# Patient Record
Sex: MALE | Race: WHITE | NOT HISPANIC OR LATINO
[De-identification: names, ages, dates, MRNs, and addresses within clinical notes are randomized per-mention and may not be internally consistent; named-entity substitution may affect disease eponyms.]

---

## 2019-07-03 ENCOUNTER — APPOINTMENT (OUTPATIENT)
Dept: CARDIOLOGY | Facility: CLINIC | Age: 59
End: 2019-07-03
Payer: COMMERCIAL

## 2019-07-03 PROCEDURE — 99214 OFFICE O/P EST MOD 30 MIN: CPT | Mod: 25

## 2019-07-03 PROCEDURE — 93283 PRGRMG EVAL IMPLANTABLE DFB: CPT | Mod: 59

## 2019-07-03 PROCEDURE — 93290 INTERROG DEV EVAL ICPMS IP: CPT | Mod: 59

## 2019-09-26 ENCOUNTER — RESULT REVIEW (OUTPATIENT)
Age: 59
End: 2019-09-26

## 2019-09-26 ENCOUNTER — TRANSCRIPTION ENCOUNTER (OUTPATIENT)
Age: 59
End: 2019-09-26

## 2019-09-26 ENCOUNTER — OUTPATIENT (OUTPATIENT)
Dept: OUTPATIENT SERVICES | Facility: HOSPITAL | Age: 59
LOS: 1 days | Discharge: HOME | End: 2019-09-26
Payer: COMMERCIAL

## 2019-09-26 VITALS
SYSTOLIC BLOOD PRESSURE: 107 MMHG | RESPIRATION RATE: 18 BRPM | DIASTOLIC BLOOD PRESSURE: 69 MMHG | HEART RATE: 61 BPM | TEMPERATURE: 98 F | HEIGHT: 65 IN | WEIGHT: 188.94 LBS

## 2019-09-26 VITALS — DIASTOLIC BLOOD PRESSURE: 61 MMHG | HEART RATE: 66 BPM | RESPIRATION RATE: 18 BRPM | SYSTOLIC BLOOD PRESSURE: 109 MMHG

## 2019-09-26 DIAGNOSIS — I25.810 ATHEROSCLEROSIS OF CORONARY ARTERY BYPASS GRAFT(S) WITHOUT ANGINA PECTORIS: Chronic | ICD-10-CM

## 2019-09-26 DIAGNOSIS — C44.91 BASAL CELL CARCINOMA OF SKIN, UNSPECIFIED: Chronic | ICD-10-CM

## 2019-09-26 DIAGNOSIS — K43.2 INCISIONAL HERNIA WITHOUT OBSTRUCTION OR GANGRENE: Chronic | ICD-10-CM

## 2019-09-26 DIAGNOSIS — Z90.49 ACQUIRED ABSENCE OF OTHER SPECIFIED PARTS OF DIGESTIVE TRACT: Chronic | ICD-10-CM

## 2019-09-26 PROCEDURE — 88305 TISSUE EXAM BY PATHOLOGIST: CPT | Mod: 26

## 2019-09-26 NOTE — ASU PATIENT PROFILE, ADULT - PSH
Basal cell carcinoma    Coronary artery disease involving coronary bypass graft    History of cholecystectomy    Incisional hernia

## 2019-09-26 NOTE — H&P PST ADULT - NSICDXPASTSURGICALHX_GEN_ALL_CORE_FT
PAST SURGICAL HISTORY:  Basal cell carcinoma     Coronary artery disease involving coronary bypass graft     History of cholecystectomy     Incisional hernia

## 2019-09-27 LAB — SURGICAL PATHOLOGY STUDY: SIGNIFICANT CHANGE UP

## 2019-10-01 DIAGNOSIS — Z12.11 ENCOUNTER FOR SCREENING FOR MALIGNANT NEOPLASM OF COLON: ICD-10-CM

## 2019-10-01 DIAGNOSIS — K64.8 OTHER HEMORRHOIDS: ICD-10-CM

## 2019-10-01 DIAGNOSIS — K57.30 DIVERTICULOSIS OF LARGE INTESTINE WITHOUT PERFORATION OR ABSCESS WITHOUT BLEEDING: ICD-10-CM

## 2019-12-04 ENCOUNTER — APPOINTMENT (OUTPATIENT)
Dept: CARDIOLOGY | Facility: CLINIC | Age: 59
End: 2019-12-04

## 2020-07-03 PROBLEM — E78.00 PURE HYPERCHOLESTEROLEMIA, UNSPECIFIED: Chronic | Status: ACTIVE | Noted: 2019-09-26

## 2020-07-03 PROBLEM — I25.10 ATHEROSCLEROTIC HEART DISEASE OF NATIVE CORONARY ARTERY WITHOUT ANGINA PECTORIS: Chronic | Status: ACTIVE | Noted: 2019-09-26

## 2020-07-22 ENCOUNTER — APPOINTMENT (OUTPATIENT)
Dept: CARDIOLOGY | Facility: CLINIC | Age: 60
End: 2020-07-22
Payer: COMMERCIAL

## 2020-07-22 PROCEDURE — 93283 PRGRMG EVAL IMPLANTABLE DFB: CPT | Mod: 59

## 2020-07-22 PROCEDURE — 93290 INTERROG DEV EVAL ICPMS IP: CPT | Mod: 59

## 2020-07-22 PROCEDURE — 99214 OFFICE O/P EST MOD 30 MIN: CPT | Mod: 25

## 2020-09-18 ENCOUNTER — RECORD ABSTRACTING (OUTPATIENT)
Age: 60
End: 2020-09-18

## 2020-09-18 DIAGNOSIS — Z87.19 PERSONAL HISTORY OF OTHER DISEASES OF THE DIGESTIVE SYSTEM: ICD-10-CM

## 2020-09-18 DIAGNOSIS — I25.2 OLD MYOCARDIAL INFARCTION: ICD-10-CM

## 2020-09-18 DIAGNOSIS — Z86.79 PERSONAL HISTORY OF OTHER DISEASES OF THE CIRCULATORY SYSTEM: ICD-10-CM

## 2020-09-18 DIAGNOSIS — Z78.9 OTHER SPECIFIED HEALTH STATUS: ICD-10-CM

## 2020-09-28 NOTE — ASU PATIENT PROFILE, ADULT - BLOOD TRANSFUSION, PREVIOUS, PROFILE
Body Location Override (Optional - Billing Will Still Be Based On Selected Body Map Location If Applicable): left post shoulder Detail Level: Detailed Add 08990 Cpt? (Important Note: In 2017 The Use Of 21761 Is Being Tracked By Cms To Determine Future Global Period Reimbursement For Global Periods): yes no

## 2020-10-07 ENCOUNTER — APPOINTMENT (OUTPATIENT)
Dept: CARDIOLOGY | Facility: CLINIC | Age: 60
End: 2020-10-07
Payer: COMMERCIAL

## 2020-10-07 PROCEDURE — 93306 TTE W/DOPPLER COMPLETE: CPT

## 2020-10-19 ENCOUNTER — APPOINTMENT (OUTPATIENT)
Dept: CARDIOLOGY | Facility: CLINIC | Age: 60
End: 2020-10-19
Payer: COMMERCIAL

## 2020-10-19 VITALS
TEMPERATURE: 98.1 F | HEART RATE: 54 BPM | DIASTOLIC BLOOD PRESSURE: 60 MMHG | BODY MASS INDEX: 30.66 KG/M2 | SYSTOLIC BLOOD PRESSURE: 100 MMHG | HEIGHT: 65 IN | WEIGHT: 184 LBS

## 2020-10-19 DIAGNOSIS — Z00.00 ENCOUNTER FOR GENERAL ADULT MEDICAL EXAMINATION W/OUT ABNORMAL FINDINGS: ICD-10-CM

## 2020-10-19 PROCEDURE — 99214 OFFICE O/P EST MOD 30 MIN: CPT | Mod: 25

## 2020-10-19 PROCEDURE — 93283 PRGRMG EVAL IMPLANTABLE DFB: CPT | Mod: 59

## 2020-10-19 PROCEDURE — 93000 ELECTROCARDIOGRAM COMPLETE: CPT | Mod: 59

## 2020-10-19 PROCEDURE — 93290 INTERROG DEV EVAL ICPMS IP: CPT | Mod: 26

## 2020-10-19 PROCEDURE — 99072 ADDL SUPL MATRL&STAF TM PHE: CPT

## 2020-10-19 NOTE — HISTORY OF PRESENT ILLNESS
[de-identified] : 58 Y/O MALE WITH PMH OF CAD, POST MI 2006, CABG 3 VS 2006, ICM, POST  ICD MDTRONIC 2006,84862, GALL BLADDER/HERNIA 2013\par \par RETURNING FOR FOLLOW UP: DISCUSS RECENT ECHO RESULTS \par \par DENIES CP/SOB OR PALPITATIONS. NO DIZZINESS , NO SYNCOPE . STABLE ALMEIDA. \par \par

## 2020-10-19 NOTE — ADDENDUM
[FreeTextEntry1] : I was present with the NP/PA during the history and exam of the patient. I discussed patient’s management in details. I reviewed the NP’s note and agree with the documented findings and plan of care. Please do not hesitate to contact me if you have any further questions at [phone]. \par \par \par

## 2020-10-19 NOTE — PROCEDURE
[No] : not [NSR] : normal sinus rhythm [ICD] : Implantable cardioverter-defibrillator [DDD] : DDD [Voltage: ___ volts] : Voltage was [unfilled] volts [Threshold Testing Performed] : Threshold testing was performed [Magnet Rate: ___ Ppm] : magnet rate was [unfilled] Ppm [Lead Imp:  ___ohms] : lead impedance was [unfilled] ohms [Sensing Amplitude ___mv] : sensing amplitude was [unfilled] mv [___V @] : [unfilled] V [___ ms] : [unfilled] ms [Counters Reset] : the counters were reset [None] : none [Asense-Vsense ___ %] : Asense-Vsense [unfilled]% [Asense-Vpace ___ %] : Asense-Vpace [unfilled]% [Apace-Vsense ___ %] : Apace-Vsense [unfilled]% [Apace-Vpace ___ %] : Apace-Vpace [unfilled]% [de-identified] : [id number] [de-identified] : SUKHDEV [de-identified] : [id number] [de-identified] : 5/1/2013 [de-identified] : 50 [de-identified] : CHINEDU [de-identified] : Normal device function. 1 NSVT on 8/13/20 lasting 3 seconds in duration (avg. v-rate 175 bpm). 1 SVT on 10/5/20 lasting 30 seconds (avg. v-rate 158 bpm). Optivol Fluid index is within normal limits.

## 2020-10-19 NOTE — ASSESSMENT
[FreeTextEntry1] : ECG ( 10/19/2020) : SINUS BRADYCARDIA AT 54 BP, REMOTE MI \par \par AICD INTERROGATION: NORMAL FUNCTION. 1 NSVT -13 SECONDS, 1 EPISODE OF ST . ALL PARAMETER STABLE.  THORACIC IMPEDANCE IS STABLE . \par HIS REMOTE MONITOR IS DISCONNECTED , HE IS NOT INTERESTED IN REMOTE SERVICES AT THIS TIME. \par \par ECHO (10/7/2020): EF 20-25%, TRACE MR \par REPORTS C/W MEDS . \par \par A/P \par \par 1. ICM/ S/P AICD 5/2013 \par \par -CONTINUE GDMT FOR ICM \par -CONTINUE CURRENT MEDS\par -OBTAIN MUGA SCAN TO REASSESS LVF  ( DISCREPANCY IN ECHO FINDINGS)  \par -RETURN FOR FOLLOW UP IN 3 MONTHS \par -F/U WITH CARDIO/DR ABDULLAHI AS SCHEDULED

## 2020-11-19 ENCOUNTER — NON-APPOINTMENT (OUTPATIENT)
Age: 60
End: 2020-11-19

## 2021-01-20 ENCOUNTER — APPOINTMENT (OUTPATIENT)
Dept: CARDIOLOGY | Facility: CLINIC | Age: 61
End: 2021-01-20

## 2021-01-27 NOTE — PHYSICAL EXAM
[General Appearance - Well Nourished] : well nourished [General Appearance - Well Developed] : well developed [Heart Sounds] : normal S1 and S2 No [] : no respiratory distress [Heart Rate And Rhythm] : heart rate and rhythm were normal [Left Infraclavicular] : left infraclavicular area [Respiration, Rhythm And Depth] : normal respiratory rhythm and effort [Dry] : dry [Clean] : clean [Bowel Sounds] : normal bowel sounds [Well-Healed] : well-healed [Abdomen Soft] : soft [Nail Clubbing] : no clubbing of the fingernails [Cyanosis, Localized] : no localized cyanosis

## 2021-02-16 ENCOUNTER — APPOINTMENT (OUTPATIENT)
Dept: CARDIOLOGY | Facility: CLINIC | Age: 61
End: 2021-02-16
Payer: COMMERCIAL

## 2021-02-16 PROCEDURE — 99072 ADDL SUPL MATRL&STAF TM PHE: CPT

## 2021-02-16 PROCEDURE — 93290 INTERROG DEV EVAL ICPMS IP: CPT | Mod: 26

## 2021-02-16 PROCEDURE — 93282 PRGRMG EVAL IMPLANTABLE DFB: CPT

## 2021-02-16 PROCEDURE — 99213 OFFICE O/P EST LOW 20 MIN: CPT

## 2021-02-16 NOTE — PHYSICAL EXAM
[General Appearance - Well Developed] : well developed [Normal Appearance] : normal appearance [Well Groomed] : well groomed [General Appearance - Well Nourished] : well nourished [No Deformities] : no deformities [Heart Rate And Rhythm] : heart rate and rhythm were normal [General Appearance - In No Acute Distress] : no acute distress [Heart Sounds] : normal S1 and S2 [Murmurs] : no murmurs present [Respiration, Rhythm And Depth] : normal respiratory rhythm and effort [Exaggerated Use Of Accessory Muscles For Inspiration] : no accessory muscle use [Auscultation Breath Sounds / Voice Sounds] : lungs were clear to auscultation bilaterally [Clean] : clean [Dry] : dry [Well-Healed] : well-healed [Abdomen Soft] : soft [Abdomen Tenderness] : non-tender [Abdomen Mass (___ Cm)] : no abdominal mass palpated [Nail Clubbing] : no clubbing of the fingernails [Cyanosis, Localized] : no localized cyanosis [] : no ischemic changes [Petechial Hemorrhages (___cm)] : no petechial hemorrhages

## 2021-02-16 NOTE — HISTORY OF PRESENT ILLNESS
[None] : The patient complains of no symptoms [Palpitations] : no palpitations [SOB] : no dyspnea [Erythema at Site] : no erythema at device site [Swelling at Site] : no swelling at device site

## 2021-02-16 NOTE — PROCEDURE
[See Scanned Paceart Report] : See scanned paceart report [See Device Printout] : See device printout [ICD] : Implantable cardioverter-defibrillator [de-identified] : Medtronic

## 2021-06-07 PROBLEM — I25.2 PAST MYOCARDIAL INFARCTION: Status: ACTIVE | Noted: 2021-06-07

## 2021-06-08 ENCOUNTER — APPOINTMENT (OUTPATIENT)
Dept: CARDIOLOGY | Facility: CLINIC | Age: 61
End: 2021-06-08
Payer: COMMERCIAL

## 2021-06-08 VITALS
HEIGHT: 65 IN | WEIGHT: 188 LBS | DIASTOLIC BLOOD PRESSURE: 64 MMHG | BODY MASS INDEX: 31.32 KG/M2 | SYSTOLIC BLOOD PRESSURE: 112 MMHG

## 2021-06-08 DIAGNOSIS — I25.2 OLD MYOCARDIAL INFARCTION: ICD-10-CM

## 2021-06-08 PROCEDURE — 99213 OFFICE O/P EST LOW 20 MIN: CPT

## 2021-06-08 PROCEDURE — 99072 ADDL SUPL MATRL&STAF TM PHE: CPT

## 2021-06-08 NOTE — PHYSICAL EXAM
[Normal Appearance] : was normal in appearance [5th Left ICS - MCL] : palpated at the 5th LICS in the midclavicular line [No Precordial Heave] : no precordial heave was noted [Normal Rate] : normal [Rhythm Regular] : regular [Normal S1] : normal S1 [Normal S2] : normal S2 [I] : a grade 1 [No Pitting Edema] : no pitting edema present [2+] : left 2+ [No Abnormalities] : the abdominal aorta was not enlarged and no bruit was heard [Normal] : alert and oriented, normal memory [JVP Elevated ___cm] : the JVP was not elevated [S3] : no S3 [S4] : no S4 [Right Carotid Bruit] : no bruit heard over the right carotid [Left Carotid Bruit] : no bruit heard over the left carotid

## 2021-06-08 NOTE — REVIEW OF SYSTEMS
[Fever] : no fever [Blurry Vision] : no blurred vision [Hearing Loss] : no hearing loss [SOB] : no shortness of breath [Chest Discomfort] : no chest discomfort [Palpitations] : no palpitations [Cough] : no cough [Wheezing] : no wheezing [Abdominal Pain] : no abdominal pain [Urinary Frequency] : no change in urinary frequency [Joint Pain] : no joint pain [Joint Swelling] : no joint swelling [Rash] : no rash [Dizziness] : no dizziness [Confusion] : no confusion was observed [Easy Bleeding] : no tendency for easy bleeding

## 2021-06-08 NOTE — DISCUSSION/SUMMARY
[FreeTextEntry1] : \par \par \par  The patient had his AICD replaced 5/1/13. Will need replacement soon treva.  HDl 49. He gained 4  pound.   Told again resume  exercise.  Told continue eat fish.    Depression better. AICD not fire . He use to see Jen. Now  Treva .History  CABG over 10 years ago. Last SE 5/19. Fixed EF 35-44%    Lost job 7/18. Now working home. At times ALMEIDA.   SE  possibly sooner.  Ef by echo jen 20-26% . He needs weiight loss and exercise. Working from home.

## 2021-06-08 NOTE — HISTORY OF PRESENT ILLNESS
[FreeTextEntry1] : The patient states he has been doing well with his coronary artery disease symptoms since the last visit. He has no comorbid illnesses. He has no known CAD complications. He has no significant interval events. \par \par Symptoms: denies chest pain when at rest, denies exertional chest pain, denies dyspnea, denies fatigue and stable exercise intolerance. \par \par Associated symptoms include no syncope, no palpitations, no PND, no orthopnea and no edema.\par \par \par \par \par Symptoms:  no chest pain, no dyspnea at rest, no dyspnea on exertion, no edema and no fatigue. \par \par \par \par \par \par \par \par Symptoms: Denied: claudication, chest pain, chest pressure, dizziness, dyspnea, exertional dyspnea, dyspnea at rest, edema, irregular heartbeat, lightheadedness, orthopnea, shortness of breath, syncope, exertional syncope, near syncope and palpitations. \par \par Lifestyle and Disease Management: Diet: He consumes a diverse and healthy diet. Weight Issues: He does not have any weight concerns. Exercise: He does not exercise regularly. Smoking: He does not use tobacco. Alcohol: He denies alcohol use. \par \par \par \par \par Comorbid Illnesses: hyperlipidemia. He has no significant interval events. \par \par Symptoms: denies chest pain at rest, denies exertional chest pain, denies dyspnea, denies fatigue, stable exercise intolerance, denies palpitations, denies edema, denies orthopnea, denies claudication, denies dizziness and denies orthostatic dizziness. \par \par Associated symptoms: no syncope, no tendency for easy bruising and no TIA symptoms. \par He loera not exercise\par \par Disease Monitoring: The patient has had a 5 pounds weight gain.

## 2021-06-18 ENCOUNTER — APPOINTMENT (OUTPATIENT)
Dept: CARDIOLOGY | Facility: CLINIC | Age: 61
End: 2021-06-18
Payer: COMMERCIAL

## 2021-06-18 VITALS — BODY MASS INDEX: 30.99 KG/M2 | TEMPERATURE: 97.4 F | HEIGHT: 65 IN | WEIGHT: 186 LBS

## 2021-06-18 PROCEDURE — 93290 INTERROG DEV EVAL ICPMS IP: CPT

## 2021-06-18 PROCEDURE — 99213 OFFICE O/P EST LOW 20 MIN: CPT | Mod: 25

## 2021-06-18 PROCEDURE — 93283 PRGRMG EVAL IMPLANTABLE DFB: CPT

## 2021-06-18 PROCEDURE — 99072 ADDL SUPL MATRL&STAF TM PHE: CPT

## 2021-06-18 NOTE — HISTORY OF PRESENT ILLNESS
[None] : The patient complains of no symptoms [Palpitations] : no palpitations [SOB] : no dyspnea [Syncope] : no syncope [Dizziness] : no dizziness [Chest Pain] : no chest pain or discomfort [ICD Shocks] : no recent ICD shocks [Erythema at Site] : no erythema at device site [Swelling at Site] : no swelling at device site

## 2021-06-18 NOTE — ASSESSMENT
[FreeTextEntry1] : Device interrogation\par - AICD function normal. All parameters stable. \par - Discussed generator change with the patient. At this time, we will see patient in office in two months to assess battery.\par - Patient not on remote monitoring. Device tones demonstrated to patient and wife. He is aware to call office if he hears tones.\par \par Plan:\par c/w current meds\par RTO in 2 months\par

## 2021-06-18 NOTE — PROCEDURE
[No] : not [NSR] : normal sinus rhythm [See Scanned Paceart Report] : See scanned paceart report [See Device Printout] : See device printout [Voltage: ___ volts] : Voltage was [unfilled] volts [Charge Time: ___ sec] : charge time was [unfilled] seconds [Threshold Testing Performed] : Threshold testing was performed [Lead Imp:  ___ohms] : lead impedance was [unfilled] ohms [Sensing Amplitude ___mv] : sensing amplitude was [unfilled] mv [___V @] : [unfilled] V [___ ms] : [unfilled] ms [Asense-Vsense ___ %] : Asense-Vsense [unfilled]% [Asense-Vpace ___ %] : Asense-Vpace [unfilled]% [Apace-Vsense ___ %] : Apace-Vsense [unfilled]% [Apace-Vpace ___ %] : Apace-Vpace [unfilled]% [de-identified] : SUKHDEV [de-identified] : Lou [id number] [de-identified] : [id number] [de-identified] : 5/1/2013 [de-identified] : CHINEDU [de-identified] :  [de-identified] : RRT=2.63 [de-identified] : 3 brief episodes NSVT 1 second in duration\par Total  <0.1%\par Optivol below threshold

## 2021-08-13 ENCOUNTER — APPOINTMENT (OUTPATIENT)
Dept: CARDIOLOGY | Facility: CLINIC | Age: 61
End: 2021-08-13
Payer: COMMERCIAL

## 2021-08-13 VITALS
TEMPERATURE: 95.9 F | HEIGHT: 65 IN | BODY MASS INDEX: 31.16 KG/M2 | HEART RATE: 50 BPM | SYSTOLIC BLOOD PRESSURE: 120 MMHG | WEIGHT: 187 LBS | DIASTOLIC BLOOD PRESSURE: 75 MMHG

## 2021-08-13 DIAGNOSIS — Z95.810 PRESENCE OF AUTOMATIC (IMPLANTABLE) CARDIAC DEFIBRILLATOR: ICD-10-CM

## 2021-08-13 PROCEDURE — 93283 PRGRMG EVAL IMPLANTABLE DFB: CPT

## 2021-08-16 ENCOUNTER — APPOINTMENT (OUTPATIENT)
Dept: CARDIOLOGY | Facility: CLINIC | Age: 61
End: 2021-08-16

## 2021-08-20 PROBLEM — Z95.810 CARDIAC DEFIBRILLATOR IN SITU: Status: RESOLVED | Noted: 2021-08-20 | Resolved: 2021-08-20

## 2021-08-20 NOTE — PHYSICAL EXAM
[General Appearance - Well Developed] : well developed [Normal Appearance] : normal appearance [Well Groomed] : well groomed [General Appearance - Well Nourished] : well nourished [No Deformities] : no deformities [General Appearance - In No Acute Distress] : no acute distress [Heart Rate And Rhythm] : heart rate and rhythm were normal [Heart Sounds] : normal S1 and S2 [Murmurs] : no murmurs present [] : no respiratory distress [Respiration, Rhythm And Depth] : normal respiratory rhythm and effort [Exaggerated Use Of Accessory Muscles For Inspiration] : no accessory muscle use [Auscultation Breath Sounds / Voice Sounds] : lungs were clear to auscultation bilaterally [Left Infraclavicular] : left infraclavicular area [Abdomen Soft] : soft [Cyanosis, Localized] : no localized cyanosis

## 2021-08-20 NOTE — HISTORY OF PRESENT ILLNESS
[de-identified] : 59 Y/O MALE WITH PMH OF CAD, POST MI 2006, 3 vessel CABG  2006, ICM, post dual chamber  Medtronic ICD 2006, 2013, \par \par Presents for device interrogation to check battery longevity.\par Denies any sob, ALMEIDA, PND, orthopnea, no palpitations, no dizziness,lightheadedness, denies chest pains\par ECG ( 8/13/2021) 50 bpm sinus britany\par ECHO ( 10/7/2020) EF 20-26%\par \par

## 2021-08-20 NOTE — PROCEDURE
[No] : not [NSR] : normal sinus rhythm [See Scanned Paceart Report] : See scanned paceart report [See Device Printout] : See device printout [Voltage: ___ volts] : Voltage was [unfilled] volts [Charge Time: ___ sec] : charge time was [unfilled] seconds [Threshold Testing Performed] : Threshold testing was performed [Lead Imp:  ___ohms] : lead impedance was [unfilled] ohms [Sensing Amplitude ___mv] : sensing amplitude was [unfilled] mv [___V @] : [unfilled] V [___ ms] : [unfilled] ms [Asense-Vsense ___ %] : Asense-Vsense [unfilled]% [Asense-Vpace ___ %] : Asense-Vpace [unfilled]% [Apace-Vsense ___ %] : Apace-Vsense [unfilled]% [Apace-Vpace ___ %] : Apace-Vpace [unfilled]% [de-identified] : SUKHDEV [de-identified] : Lou [id number] [de-identified] : [id number] [de-identified] : 5/1/2013 [de-identified] : CHINEDU [de-identified] :  [de-identified] : RRT=2.63

## 2021-08-20 NOTE — ASSESSMENT
[FreeTextEntry1] : DEvice interrogation\par Battery voltage is 2.63 ( RRT 2.63) - hasn't trigger the RRT indicator yet. ( patient not on remote)\par \par Wife and patient agreed to call once he hears the audio alert for RRT.\par \par Patient secured Sept 15, 2021 for the implant date. Report they will be busy the next coming months.\par  I have explained the risks and benefits of the procedure to the patient.  There is approximately 1% chance of any major cardiovascular complication to occur. Complications include, but are not limited to infection, bleeding, damage to the vessels, pneumothorax, stroke, death and heart attack.  \par It will be an ambulatory procedure.\par They expressed full understanding and agreed not to have another office visit prior to procedure

## 2021-09-01 ENCOUNTER — OUTPATIENT (OUTPATIENT)
Dept: OUTPATIENT SERVICES | Facility: HOSPITAL | Age: 61
LOS: 1 days | Discharge: HOME | End: 2021-09-01
Payer: COMMERCIAL

## 2021-09-01 VITALS
OXYGEN SATURATION: 100 % | HEART RATE: 63 BPM | HEIGHT: 65 IN | RESPIRATION RATE: 20 BRPM | TEMPERATURE: 98 F | DIASTOLIC BLOOD PRESSURE: 65 MMHG | SYSTOLIC BLOOD PRESSURE: 120 MMHG | WEIGHT: 190.04 LBS

## 2021-09-01 DIAGNOSIS — Z98.890 OTHER SPECIFIED POSTPROCEDURAL STATES: Chronic | ICD-10-CM

## 2021-09-01 DIAGNOSIS — T82.111D BREAKDOWN (MECHANICAL) OF CARDIAC PULSE GENERATOR (BATTERY), SUBSEQUENT ENCOUNTER: ICD-10-CM

## 2021-09-01 DIAGNOSIS — Z90.49 ACQUIRED ABSENCE OF OTHER SPECIFIED PARTS OF DIGESTIVE TRACT: Chronic | ICD-10-CM

## 2021-09-01 DIAGNOSIS — K43.2 INCISIONAL HERNIA WITHOUT OBSTRUCTION OR GANGRENE: Chronic | ICD-10-CM

## 2021-09-01 DIAGNOSIS — C44.91 BASAL CELL CARCINOMA OF SKIN, UNSPECIFIED: Chronic | ICD-10-CM

## 2021-09-01 DIAGNOSIS — Z01.818 ENCOUNTER FOR OTHER PREPROCEDURAL EXAMINATION: ICD-10-CM

## 2021-09-01 DIAGNOSIS — I25.810 ATHEROSCLEROSIS OF CORONARY ARTERY BYPASS GRAFT(S) WITHOUT ANGINA PECTORIS: Chronic | ICD-10-CM

## 2021-09-01 LAB
ALBUMIN SERPL ELPH-MCNC: 4.5 G/DL — SIGNIFICANT CHANGE UP (ref 3.5–5.2)
ALP SERPL-CCNC: 54 U/L — SIGNIFICANT CHANGE UP (ref 30–115)
ALT FLD-CCNC: 23 U/L — SIGNIFICANT CHANGE UP (ref 0–41)
ANION GAP SERPL CALC-SCNC: 12 MMOL/L — SIGNIFICANT CHANGE UP (ref 7–14)
APPEARANCE UR: CLEAR — SIGNIFICANT CHANGE UP
APTT BLD: 31.2 SEC — SIGNIFICANT CHANGE UP (ref 27–39.2)
AST SERPL-CCNC: 18 U/L — SIGNIFICANT CHANGE UP (ref 0–41)
BACTERIA # UR AUTO: NEGATIVE — SIGNIFICANT CHANGE UP
BASOPHILS # BLD AUTO: 0.03 K/UL — SIGNIFICANT CHANGE UP (ref 0–0.2)
BASOPHILS NFR BLD AUTO: 0.4 % — SIGNIFICANT CHANGE UP (ref 0–1)
BILIRUB SERPL-MCNC: 0.4 MG/DL — SIGNIFICANT CHANGE UP (ref 0.2–1.2)
BILIRUB UR-MCNC: NEGATIVE — SIGNIFICANT CHANGE UP
BUN SERPL-MCNC: 16 MG/DL — SIGNIFICANT CHANGE UP (ref 10–20)
CALCIUM SERPL-MCNC: 8.8 MG/DL — SIGNIFICANT CHANGE UP (ref 8.5–10.1)
CHLORIDE SERPL-SCNC: 104 MMOL/L — SIGNIFICANT CHANGE UP (ref 98–110)
CO2 SERPL-SCNC: 23 MMOL/L — SIGNIFICANT CHANGE UP (ref 17–32)
COLOR SPEC: YELLOW — SIGNIFICANT CHANGE UP
CREAT SERPL-MCNC: 1.1 MG/DL — SIGNIFICANT CHANGE UP (ref 0.7–1.5)
DIFF PNL FLD: NEGATIVE — SIGNIFICANT CHANGE UP
EOSINOPHIL # BLD AUTO: 0.09 K/UL — SIGNIFICANT CHANGE UP (ref 0–0.7)
EOSINOPHIL NFR BLD AUTO: 1.3 % — SIGNIFICANT CHANGE UP (ref 0–8)
EPI CELLS # UR: 1 /HPF — SIGNIFICANT CHANGE UP (ref 0–5)
GLUCOSE SERPL-MCNC: 111 MG/DL — HIGH (ref 70–99)
GLUCOSE UR QL: NEGATIVE — SIGNIFICANT CHANGE UP
HCT VFR BLD CALC: 40.1 % — LOW (ref 42–52)
HGB BLD-MCNC: 12.7 G/DL — LOW (ref 14–18)
HYALINE CASTS # UR AUTO: 1 /LPF — SIGNIFICANT CHANGE UP (ref 0–7)
IMM GRANULOCYTES NFR BLD AUTO: 0.3 % — SIGNIFICANT CHANGE UP (ref 0.1–0.3)
INR BLD: 0.94 RATIO — SIGNIFICANT CHANGE UP (ref 0.65–1.3)
KETONES UR-MCNC: SIGNIFICANT CHANGE UP
LEUKOCYTE ESTERASE UR-ACNC: NEGATIVE — SIGNIFICANT CHANGE UP
LYMPHOCYTES # BLD AUTO: 1.87 K/UL — SIGNIFICANT CHANGE UP (ref 1.2–3.4)
LYMPHOCYTES # BLD AUTO: 26.3 % — SIGNIFICANT CHANGE UP (ref 20.5–51.1)
MCHC RBC-ENTMCNC: 28.5 PG — SIGNIFICANT CHANGE UP (ref 27–31)
MCHC RBC-ENTMCNC: 31.7 G/DL — LOW (ref 32–37)
MCV RBC AUTO: 89.9 FL — SIGNIFICANT CHANGE UP (ref 80–94)
MONOCYTES # BLD AUTO: 0.61 K/UL — HIGH (ref 0.1–0.6)
MONOCYTES NFR BLD AUTO: 8.6 % — SIGNIFICANT CHANGE UP (ref 1.7–9.3)
MRSA PCR RESULT.: NEGATIVE — SIGNIFICANT CHANGE UP
NEUTROPHILS # BLD AUTO: 4.5 K/UL — SIGNIFICANT CHANGE UP (ref 1.4–6.5)
NEUTROPHILS NFR BLD AUTO: 63.1 % — SIGNIFICANT CHANGE UP (ref 42.2–75.2)
NITRITE UR-MCNC: NEGATIVE — SIGNIFICANT CHANGE UP
NRBC # BLD: 0 /100 WBCS — SIGNIFICANT CHANGE UP (ref 0–0)
PH UR: 5.5 — SIGNIFICANT CHANGE UP (ref 5–8)
PLATELET # BLD AUTO: 237 K/UL — SIGNIFICANT CHANGE UP (ref 130–400)
POTASSIUM SERPL-MCNC: 4 MMOL/L — SIGNIFICANT CHANGE UP (ref 3.5–5)
POTASSIUM SERPL-SCNC: 4 MMOL/L — SIGNIFICANT CHANGE UP (ref 3.5–5)
PROT SERPL-MCNC: 6.5 G/DL — SIGNIFICANT CHANGE UP (ref 6–8)
PROT UR-MCNC: ABNORMAL
PROTHROM AB SERPL-ACNC: 10.8 SEC — SIGNIFICANT CHANGE UP (ref 9.95–12.87)
RBC # BLD: 4.46 M/UL — LOW (ref 4.7–6.1)
RBC # FLD: 13.2 % — SIGNIFICANT CHANGE UP (ref 11.5–14.5)
RBC CASTS # UR COMP ASSIST: 3 /HPF — SIGNIFICANT CHANGE UP (ref 0–4)
SODIUM SERPL-SCNC: 139 MMOL/L — SIGNIFICANT CHANGE UP (ref 135–146)
SP GR SPEC: 1.03 — HIGH (ref 1.01–1.03)
UROBILINOGEN FLD QL: SIGNIFICANT CHANGE UP
WBC # BLD: 7.12 K/UL — SIGNIFICANT CHANGE UP (ref 4.8–10.8)
WBC # FLD AUTO: 7.12 K/UL — SIGNIFICANT CHANGE UP (ref 4.8–10.8)
WBC UR QL: 2 /HPF — SIGNIFICANT CHANGE UP (ref 0–5)

## 2021-09-01 PROCEDURE — 93018 CV STRESS TEST I&R ONLY: CPT

## 2021-09-01 RX ORDER — SIMVASTATIN 20 MG/1
1 TABLET, FILM COATED ORAL
Qty: 0 | Refills: 0 | DISCHARGE

## 2021-09-01 RX ORDER — CARVEDILOL PHOSPHATE 80 MG/1
1 CAPSULE, EXTENDED RELEASE ORAL
Qty: 0 | Refills: 0 | DISCHARGE

## 2021-09-01 RX ORDER — ASPIRIN/CALCIUM CARB/MAGNESIUM 324 MG
1 TABLET ORAL
Qty: 0 | Refills: 0 | DISCHARGE

## 2021-09-01 RX ORDER — BENAZEPRIL HYDROCHLORIDE 40 MG/1
1 TABLET ORAL
Qty: 0 | Refills: 0 | DISCHARGE

## 2021-09-01 NOTE — H&P PST ADULT - HISTORY OF PRESENT ILLNESS
Patient is a  60 year old male presenting to PAST in preparation for on an ICD battery change under sedation anesthesia by   Pt reports he has had his medtronic icd since aprox 7-8 yrs ago. He was ahving his icd interrogated and was told the battery needed to be replaced.  denies any complaints from icd   reports no c/o cp,sob,palpitations,cough or dysuria  1-2 fos without sob      Patient denies any signs or symptoms of COVID 19 and denies contact with known positive individuals.  They have an appointment for repeat COVID testing pre-procedure and acknowledge its time and place.  They were instructed to quarantine pre-procedure, practice exposure control measures, continue to self-monitor and report any concerns to their proceduralist.      Anesthesia Alert  NO--Difficult Airway  NO--History of neck surgery or radiation  NO--Limited ROM of neck  NO--History of Malignant hyperthermia  NO--Personal or family history of Pseudocholinesterase deficiency  NO--Prior Anesthesia Complication  NO--Latex Allergy  NO--Loose teeth  NO--History of Rheumatoid Arthritis  NO--LYLE  NO-- BLEEDING RISK  NO--Other_____    As per patient, this is their complete medical and surgical history, including medications both prescribed or over the counter.  Patient verbalized understanding of instructions and was given the opportunity to ask questions and have them answered.

## 2021-09-01 NOTE — H&P PST ADULT - NSICDXPASTMEDICALHX_GEN_ALL_CORE_FT
PAST MEDICAL HISTORY:  CAD (coronary artery disease)     Hypercholesteremia     MI, old 2006    Skin cancer, basal cell

## 2021-09-01 NOTE — H&P PST ADULT - NSICDXFAMILYHX_GEN_ALL_CORE_FT
FAMILY HISTORY:  Mother  Still living? No  FH: Alzheimers disease, Age at diagnosis: Age Unknown

## 2021-09-01 NOTE — H&P PST ADULT - NSICDXPASTSURGICALHX_GEN_ALL_CORE_FT
PAST SURGICAL HISTORY:  Basal cell carcinoma     Coronary artery disease involving coronary bypass graft 2006    History of ankle surgery     History of cholecystectomy     Incisional hernia

## 2021-09-02 LAB
CULTURE RESULTS: NO GROWTH — SIGNIFICANT CHANGE UP
SPECIMEN SOURCE: SIGNIFICANT CHANGE UP

## 2021-09-12 ENCOUNTER — OUTPATIENT (OUTPATIENT)
Dept: OUTPATIENT SERVICES | Facility: HOSPITAL | Age: 61
LOS: 1 days | Discharge: HOME | End: 2021-09-12

## 2021-09-12 DIAGNOSIS — Z11.59 ENCOUNTER FOR SCREENING FOR OTHER VIRAL DISEASES: ICD-10-CM

## 2021-09-12 DIAGNOSIS — I25.810 ATHEROSCLEROSIS OF CORONARY ARTERY BYPASS GRAFT(S) WITHOUT ANGINA PECTORIS: Chronic | ICD-10-CM

## 2021-09-12 DIAGNOSIS — K43.2 INCISIONAL HERNIA WITHOUT OBSTRUCTION OR GANGRENE: Chronic | ICD-10-CM

## 2021-09-12 DIAGNOSIS — C44.91 BASAL CELL CARCINOMA OF SKIN, UNSPECIFIED: Chronic | ICD-10-CM

## 2021-09-12 DIAGNOSIS — Z90.49 ACQUIRED ABSENCE OF OTHER SPECIFIED PARTS OF DIGESTIVE TRACT: Chronic | ICD-10-CM

## 2021-09-12 DIAGNOSIS — Z98.890 OTHER SPECIFIED POSTPROCEDURAL STATES: Chronic | ICD-10-CM

## 2021-09-12 PROBLEM — I25.2 OLD MYOCARDIAL INFARCTION: Chronic | Status: ACTIVE | Noted: 2019-09-26

## 2021-09-15 ENCOUNTER — OUTPATIENT (OUTPATIENT)
Dept: OUTPATIENT SERVICES | Facility: HOSPITAL | Age: 61
LOS: 1 days | Discharge: HOME | End: 2021-09-15
Payer: COMMERCIAL

## 2021-09-15 DIAGNOSIS — C44.91 BASAL CELL CARCINOMA OF SKIN, UNSPECIFIED: Chronic | ICD-10-CM

## 2021-09-15 DIAGNOSIS — K43.2 INCISIONAL HERNIA WITHOUT OBSTRUCTION OR GANGRENE: Chronic | ICD-10-CM

## 2021-09-15 DIAGNOSIS — Z90.49 ACQUIRED ABSENCE OF OTHER SPECIFIED PARTS OF DIGESTIVE TRACT: Chronic | ICD-10-CM

## 2021-09-15 DIAGNOSIS — Z98.890 OTHER SPECIFIED POSTPROCEDURAL STATES: Chronic | ICD-10-CM

## 2021-09-15 DIAGNOSIS — T82.111A BREAKDOWN (MECHANICAL) OF CARDIAC PULSE GENERATOR (BATTERY), INITIAL ENCOUNTER: ICD-10-CM

## 2021-09-15 DIAGNOSIS — I25.810 ATHEROSCLEROSIS OF CORONARY ARTERY BYPASS GRAFT(S) WITHOUT ANGINA PECTORIS: Chronic | ICD-10-CM

## 2021-09-15 PROCEDURE — 33263 RMVL & RPLCMT DFB GEN 2 LEAD: CPT

## 2021-09-15 RX ORDER — CEFAZOLIN SODIUM 1 G
2000 VIAL (EA) INJECTION ONCE
Refills: 0 | Status: DISCONTINUED | OUTPATIENT
Start: 2021-09-15 | End: 2021-09-29

## 2021-09-15 RX ORDER — CEFAZOLIN SODIUM 1 G
1000 VIAL (EA) INJECTION ONCE
Refills: 0 | Status: DISCONTINUED | OUTPATIENT
Start: 2021-09-15 | End: 2021-09-29

## 2021-09-15 RX ORDER — CEPHALEXIN 500 MG
1 CAPSULE ORAL
Qty: 10 | Refills: 0
Start: 2021-09-15 | End: 2021-09-19

## 2021-09-15 NOTE — CHART NOTE - NSCHARTNOTEFT_GEN_A_CORE
Cardiac Electrophysiology Procedure Note      Procedure:   Implantable Cardioverter-Defibrillator (ICD) generator change     Indication:  61 yo with history of systolic heart failure referred for generator change as it reached [facility].    A thorough discussion was had with the patient and his family concerning all aspects of ICD therapy. We reviewed the data supporting ICD therapy and how it applies individually.  We discussed the procedures, risks and outcomes of ICD implantation an living with an ICD. We discussed management of ICD therapy throughout life, including deactivation of the ICD. After all questions were answered, it was a shared decision to proceed with ICD therapy.    :  Attending: Elias Shahid MD    Equipment Implanted   Pulse Generator: [employer]  Model Number:  [id number]  Serial Number: [id number]    Equipment Explanted  Pulse Generator:   Model Number: D314  Serial Number:  [id number]         Intraprocedure Lead Parameters    Atrial Lead:  Model Number: 5076  Serial Number: [id number]  Implanted:     7/10/06  Threshold:  0.75 V @  0.5ms  Sensin.0 mV  Impedance:      361  Ohms      Ventricular Lead:  Model Number:  6949  Serial Number: [id number]   Implanted:      7/10/06  Threshold:      0.5V @ 0.5 ms  Sensin.8mV  Impedance:     532 Ohms      DESCRIPTION OF PROCEDURE  The patient was brought to the Procedure Room in a nonsedated and fasting state, having received preoperative antibiotics. Informed, written consent was obtained prior to the procedure. Intermittent boluses of Versed  was used to maintain comfort and analgesia throughout the procedure. Blood pressure, oxygenation and level of comfort were stable throughout. The left shoulder region was cleaned and prepped with serial applications of Chlorhexidine Scrub. Patient was then covered from head to toe with sterile drapes in the usual manner. The fluoroscopic anatomy of the left shoulder region was inspected, along with generator and lead position and orientation.    Twenty cc of lidocaine solution were infiltrated into the skin overlying the generator just over the previous incision scar. A 2.5 cm. incision was then made overlying and parallel to the previous incision scar. This incision was carried down to the pre-pectoral fascia using electrocautery and blunt dissection. The pacemaker capsule was identified and opened. The old device was freed from fibrous tissue and retrieved from the pocket. The leads were unscrewed and pulled from the device. The leads were connected to a  and tested. Optimal lead parameters were obtained for the ventricular lead and the atrial lead as shown above Next the pocket was inspected for bleeding, and electrocautery applied where appropriate. The wound was irrigated with copious amounts of vancomycin solution.    The leads were wiped clean and then connected to the new pulse generator. The   leads and pulse generator were placed inside a Tyrx pouch SN [id number] and  coiled into the pocket..    The wound margins approximated well, without any tension or overlap. The wound was closed using an interrupted layer of 2-0 absorbable Dacron suture for the deep fascial layer. This was followed by a continuous layer of 3-0 absorbable sutures. The skin was then closed using 4-0 absorbable Dacron sutures. Steri-strips were placed over the wound.  A dry, sterile dressing was placed over this.  Needle count were performed and found to be consistent at the end of the procedure        COMPLICATIONS:  The patient tolerated the procedure well. There were no immediate complications.    CONCLUSIONS:  Successful ICD generator change               _______________________________  Elias Shahid MD  Cardiac Electrophysiology
PACU ANESTHESIA ADMISSION NOTE      Procedure:   Post op diagnosis:      ____  Intubated  TV:______       Rate: ______      FiO2: ______    _x___  Patent Airway    __x__  Full return of protective reflexes    _x___  Full recovery from anesthesia / back to baseline     Vitals:   T:  98         R: 13                 BP: 95/48                 Sat:  97                 P: 50      Mental Status:  ___x_ Awake   __x___ Alert   _____ Drowsy   _____ Sedated    Nausea/Vomiting:  __x__ NO  ______Yes,   See Post - Op Orders          Pain Scale (0-10):  _____    Treatment: __x__ None    ____ See Post - Op/PCA Orders    Post - Operative Fluids:   ____ Oral   _x___ See Post - Op Orders    Plan: Discharge:   __x__Home       _____Floor     _____Critical Care    _____  Other:_________________    Comments:

## 2021-09-15 NOTE — PRE-ANESTHESIA EVALUATION ADULT - NSANTHPMHFT_GEN_ALL_CORE
Patient is a 60 year old male presenting to PAST in preparation for on an ICD  battery change under sedation anesthesia by   Pt reports he has had his medtronic icd since aprox 7-8 yrs ago. He was ahving  his icd interrogated and was told the battery needed to be replaced.  denies any complaints from icd      ECHO (10/7/2020): EF 20-25%, TRACE MR   REPORTS C/W MEDS.

## 2021-09-21 DIAGNOSIS — Z45.02 ENCOUNTER FOR ADJUSTMENT AND MANAGEMENT OF AUTOMATIC IMPLANTABLE CARDIAC DEFIBRILLATOR: ICD-10-CM

## 2021-09-21 DIAGNOSIS — C44.91 BASAL CELL CARCINOMA OF SKIN, UNSPECIFIED: ICD-10-CM

## 2021-09-21 DIAGNOSIS — I25.10 ATHEROSCLEROTIC HEART DISEASE OF NATIVE CORONARY ARTERY WITHOUT ANGINA PECTORIS: ICD-10-CM

## 2021-09-21 DIAGNOSIS — Z95.1 PRESENCE OF AORTOCORONARY BYPASS GRAFT: ICD-10-CM

## 2021-09-21 DIAGNOSIS — I25.2 OLD MYOCARDIAL INFARCTION: ICD-10-CM

## 2021-09-21 DIAGNOSIS — E78.00 PURE HYPERCHOLESTEROLEMIA, UNSPECIFIED: ICD-10-CM

## 2021-09-21 DIAGNOSIS — Z79.82 LONG TERM (CURRENT) USE OF ASPIRIN: ICD-10-CM

## 2021-09-24 NOTE — ASU PATIENT PROFILE, ADULT - NS PRO PT RIGHT SUPPORT PERSON
Declines Nsaids Pregnancy And Lactation Text: These medications are considered safe up to 30 weeks gestation. It is excreted in breast milk.

## 2021-10-05 ENCOUNTER — APPOINTMENT (OUTPATIENT)
Dept: CARDIOLOGY | Facility: CLINIC | Age: 61
End: 2021-10-05
Payer: COMMERCIAL

## 2021-10-05 VITALS
DIASTOLIC BLOOD PRESSURE: 80 MMHG | SYSTOLIC BLOOD PRESSURE: 122 MMHG | BODY MASS INDEX: 31.82 KG/M2 | WEIGHT: 191 LBS | HEIGHT: 65 IN

## 2021-10-05 PROCEDURE — 99214 OFFICE O/P EST MOD 30 MIN: CPT

## 2021-10-05 NOTE — HISTORY OF PRESENT ILLNESS
[FreeTextEntry1] : The patient states he has been doing well with his coronary artery disease symptoms since the last visit. He has no comorbid illnesses. He has no known CAD complications. He has no significant interval events. \par \par Symptoms: denies chest pain when at rest, denies exertional chest pain, denies dyspnea, denies fatigue and stable exercise intolerance. \par \par Associated symptoms include no syncope, no palpitations, no PND, no orthopnea and no edema.AICD changed 9/21. Leads 14 years old. Leads last 15 years. \par \par \par \par \par \par \par \par \par \par \par \par \par \par \par \par \par Disease Monitoring: The patient has had a 5 pounds weight gain.

## 2021-10-05 NOTE — REVIEW OF SYSTEMS
[Fever] : no fever [Chills] : no chills [Feeling Fatigued] : feeling fatigued [Blurry Vision] : no blurred vision [Hearing Loss] : no hearing loss [SOB] : no shortness of breath [Chest Discomfort] : no chest discomfort [Palpitations] : no palpitations [Cough] : no cough [Wheezing] : no wheezing [Abdominal Pain] : no abdominal pain [Urinary Frequency] : no change in urinary frequency [Joint Pain] : no joint pain [Joint Swelling] : no joint swelling [Rash] : no rash [Dizziness] : no dizziness [Confusion] : no confusion was observed [Easy Bleeding] : no tendency for easy bleeding

## 2021-10-05 NOTE — DISCUSSION/SUMMARY
[FreeTextEntry1] : \par \par \par  The patient had his AICD replaced 5/1/13. Will need replacement soon treva.  HDl 49. He gained 4  pound.   Told again resume  exercise.  Told continue eat fish.    Depression better. AICD not fire . He use to see Jose. Now  Treva .History  CABG over 10 years ago. Last SE 5/19.    Lost job 7/18. Now working home. At times ALMEIDA.   SE  possibly sooner.  Ef by echo bekeith 20-26% . He needs weiight loss and exercise. Working from home. .AICD changed 9/21. Leads 14 years old. Leads last 15 years. Told he will need new leads. Patient stressed. Blood reviewed. He is getting fatigue\par

## 2021-10-05 NOTE — PHYSICAL EXAM
[Normal Appearance] : was normal in appearance [JVP Elevated ___cm] : the JVP was not elevated [5th Left ICS - MCL] : palpated at the 5th LICS in the midclavicular line [No Precordial Heave] : no precordial heave was noted [Normal Rate] : normal [Rhythm Regular] : regular [Normal S1] : normal S1 [Normal S2] : normal S2 [S3] : no S3 [S4] : no S4 [I] : a grade 1 [No Pitting Edema] : no pitting edema present [2+] : left 2+ [Right Carotid Bruit] : no bruit heard over the right carotid [Left Carotid Bruit] : no bruit heard over the left carotid [No Abnormalities] : the abdominal aorta was not enlarged and no bruit was heard [Normal] : alert and oriented, normal memory

## 2021-10-18 ENCOUNTER — APPOINTMENT (OUTPATIENT)
Dept: CARDIOLOGY | Facility: CLINIC | Age: 61
End: 2021-10-18
Payer: COMMERCIAL

## 2021-10-18 VITALS
TEMPERATURE: 97.7 F | OXYGEN SATURATION: 98 % | SYSTOLIC BLOOD PRESSURE: 101 MMHG | HEART RATE: 55 BPM | HEIGHT: 65 IN | DIASTOLIC BLOOD PRESSURE: 64 MMHG | RESPIRATION RATE: 16 BRPM | BODY MASS INDEX: 31.49 KG/M2 | WEIGHT: 189 LBS

## 2021-10-18 PROCEDURE — 99024 POSTOP FOLLOW-UP VISIT: CPT

## 2021-10-18 PROCEDURE — 93283 PRGRMG EVAL IMPLANTABLE DFB: CPT

## 2021-10-19 NOTE — HISTORY OF PRESENT ILLNESS
[de-identified] : 59 y/o male  with pmh of CAD, POST MI 2006, 3 vessel CABG  2006, ICM, post dual chamber  Medtronic ICD 2006, 2013. He underwent an ACID generator replacement on 9/15/2021 . \par Returns for follow up/wound check. \par \par Denies SOB, ALMEIDA, PND, orthopnea, no palpitations, no dizziness,lightheadedness, denies chest pains.\par No fever, cough or chills. \par \par  \par ECG ( 8/13/2021) 50 bpm sinus britany\par ECHO ( 10/7/2020) EF 20-26%\par \par

## 2021-10-19 NOTE — ASSESSMENT
[FreeTextEntry1] : s/p AICD generator replacement 9/15/2021 \par \par \par Left infraclavicular AICD site: healing well, no s/s infection, no hematoma. \par \par AICD interrogation: Normal function. No events, no arrhythmias All parameters stable. \par \par Patient is enrolled in remote monitoring, however his home transmitter is disconnected for the past 15 days.  \par Instructed to send a manual transmission today to establish remote connection. \par He reports c/w meds. \par  \par \par Plan \par -Continue GDMT for HF \par -Continue remote monitoring \par -Follow up with cardiology as scheduled \par -RTC in 4 months \par

## 2021-10-19 NOTE — PROCEDURE
[No] : not [NSR] : normal sinus rhythm [See Scanned Paceart Report] : See scanned paceart report [See Device Printout] : See device printout [ICD] : Implantable cardioverter-defibrillator [Longevity: ___ months] : The estimated remaining battery life is [unfilled] months [Threshold Testing Performed] : Threshold testing was performed [Lead Imp:  ___ohms] : lead impedance was [unfilled] ohms [Sensing Amplitude ___mv] : sensing amplitude was [unfilled] mv [___V @] : [unfilled] V [___ ms] : [unfilled] ms [None] : none [Counters Reset] : the counters were reset [de-identified] : Medtronic  [de-identified] : Trudy MRI XT DR  [de-identified] : [id number] [de-identified] : 10/18/2021 [de-identified] : AAI-DDD [de-identified] : 50/130  [de-identified] : No events/ no arrhythmia

## 2022-01-06 ENCOUNTER — APPOINTMENT (OUTPATIENT)
Dept: CARDIOLOGY | Facility: CLINIC | Age: 62
End: 2022-01-06
Payer: COMMERCIAL

## 2022-01-06 VITALS
SYSTOLIC BLOOD PRESSURE: 112 MMHG | BODY MASS INDEX: 32.32 KG/M2 | DIASTOLIC BLOOD PRESSURE: 64 MMHG | WEIGHT: 194 LBS | HEIGHT: 65 IN

## 2022-01-06 PROCEDURE — 93000 ELECTROCARDIOGRAM COMPLETE: CPT

## 2022-01-06 PROCEDURE — 99214 OFFICE O/P EST MOD 30 MIN: CPT

## 2022-01-06 NOTE — DISCUSSION/SUMMARY
[FreeTextEntry1] : \par \par \par  The patient had his AICD replaced 5/1/13. Will need replacement soon treva.  HDl 49. He gained 5 pound.   Told again resume  exercise.  Told continue eat fish.    Depression better. AICD not fire . He use to see Jen. Now  Treva .History  CABG over 10 years ago. Last SE 5/19.    Lost job 7/18. Now working home. At times ALMEIDA.   SE  possibly sooner.  Ef by echo jen 20-26% . He needs weight loss and exercise. Working from home. .AICD changed 9/21. Leads 14 years old. Leads last 15 years. Told he will need new leads. Patient stressed. Pain below l breast. Area tender palpitation. Ekg reviewed . Offered stress echo to consider. Seeing pmd re pain. \par

## 2022-01-06 NOTE — HISTORY OF PRESENT ILLNESS
[FreeTextEntry1] : The patient with below l  breast pain. Better if move. Area tender. No sob. No palpitations\par \par AICD changed 9/21. Leads 14 years old. Leads last 15 years. \par \par \par \par \par \par \par \par \par \par \par \par \par \par \par \par \par Disease Monitoring: The patient has had a 5 pounds weight gain.

## 2022-01-06 NOTE — REVIEW OF SYSTEMS
[Feeling Fatigued] : feeling fatigued [Fever] : no fever [Chills] : no chills [Blurry Vision] : no blurred vision [Hearing Loss] : no hearing loss [SOB] : no shortness of breath [Chest Discomfort] : no chest discomfort [Palpitations] : no palpitations [Cough] : no cough [Wheezing] : no wheezing [Abdominal Pain] : no abdominal pain [Urinary Frequency] : no change in urinary frequency [Joint Pain] : no joint pain [Joint Swelling] : no joint swelling [Rash] : no rash [Dizziness] : no dizziness [Confusion] : no confusion was observed [Easy Bleeding] : no tendency for easy bleeding

## 2022-01-12 ENCOUNTER — OUTPATIENT (OUTPATIENT)
Dept: OUTPATIENT SERVICES | Facility: HOSPITAL | Age: 62
LOS: 1 days | Discharge: HOME | End: 2022-01-12
Payer: COMMERCIAL

## 2022-01-12 DIAGNOSIS — I25.810 ATHEROSCLEROSIS OF CORONARY ARTERY BYPASS GRAFT(S) WITHOUT ANGINA PECTORIS: Chronic | ICD-10-CM

## 2022-01-12 DIAGNOSIS — K43.2 INCISIONAL HERNIA WITHOUT OBSTRUCTION OR GANGRENE: Chronic | ICD-10-CM

## 2022-01-12 DIAGNOSIS — Z98.890 OTHER SPECIFIED POSTPROCEDURAL STATES: Chronic | ICD-10-CM

## 2022-01-12 DIAGNOSIS — R07.89 OTHER CHEST PAIN: ICD-10-CM

## 2022-01-12 DIAGNOSIS — R10.9 UNSPECIFIED ABDOMINAL PAIN: ICD-10-CM

## 2022-01-12 DIAGNOSIS — C44.91 BASAL CELL CARCINOMA OF SKIN, UNSPECIFIED: Chronic | ICD-10-CM

## 2022-01-12 DIAGNOSIS — Z90.49 ACQUIRED ABSENCE OF OTHER SPECIFIED PARTS OF DIGESTIVE TRACT: Chronic | ICD-10-CM

## 2022-01-12 PROCEDURE — 76700 US EXAM ABDOM COMPLETE: CPT | Mod: 26

## 2022-01-12 PROCEDURE — 71046 X-RAY EXAM CHEST 2 VIEWS: CPT | Mod: 26

## 2022-01-18 ENCOUNTER — APPOINTMENT (OUTPATIENT)
Dept: CARDIOLOGY | Facility: CLINIC | Age: 62
End: 2022-01-18
Payer: COMMERCIAL

## 2022-01-18 PROCEDURE — 93283 PRGRMG EVAL IMPLANTABLE DFB: CPT

## 2022-01-18 PROCEDURE — 93290 INTERROG DEV EVAL ICPMS IP: CPT

## 2022-01-18 PROCEDURE — 99213 OFFICE O/P EST LOW 20 MIN: CPT

## 2022-01-18 NOTE — PROCEDURE
[See Scanned Paceart Report] : See scanned paceart report [See Device Printout] : See device printout [ICD] : Implantable cardioverter-defibrillator [Programmed for Longevity] : output reprogrammed for improved battery longevity [de-identified] : Medtronic [de-identified] : No evetns

## 2022-01-31 NOTE — H&P PST ADULT - VASCULAR
Comments:     Last Office Visit (last PCP visit):   1/5/2022    Next Visit Date:  Future Appointments   Date Time Provider Valerie Cosby   4/5/2022  3:15  [address]       **If hasn't been seen in over a year OR hasn't followed up according to last diabetes/ADHD visit, make appointment for patient before sending refill to provider.     Rx requested:  Requested Prescriptions     Pending Prescriptions Disp Refills    buPROPion (WELLBUTRIN XL) 150 MG extended release tablet [Pharmacy Med Name: BUPROPION XL 150MG TABLETS (24 H)] 30 tablet 3     Sig: TAKE 1 TABLET BY MOUTH EVERY MORNING detailed exam

## 2022-02-18 ENCOUNTER — APPOINTMENT (OUTPATIENT)
Dept: CARDIOLOGY | Facility: CLINIC | Age: 62
End: 2022-02-18

## 2022-04-14 ENCOUNTER — NON-APPOINTMENT (OUTPATIENT)
Age: 62
End: 2022-04-14

## 2022-04-14 ENCOUNTER — APPOINTMENT (OUTPATIENT)
Dept: CARDIOLOGY | Facility: CLINIC | Age: 62
End: 2022-04-14
Payer: COMMERCIAL

## 2022-04-14 VITALS
SYSTOLIC BLOOD PRESSURE: 120 MMHG | HEIGHT: 65 IN | WEIGHT: 192 LBS | DIASTOLIC BLOOD PRESSURE: 72 MMHG | BODY MASS INDEX: 31.99 KG/M2

## 2022-04-14 PROCEDURE — 99213 OFFICE O/P EST LOW 20 MIN: CPT

## 2022-04-14 NOTE — HISTORY OF PRESENT ILLNESS
[FreeTextEntry1] : No chest pain . No sob. He has fatigue. No sob. No exercise\par \par AICD changed 9/21. Leads 14 years old. Leads last 15 years. \par \par \par \par \par \par \par \par \par \par \par \par \par \par \par \par \par

## 2022-04-14 NOTE — DISCUSSION/SUMMARY
[FreeTextEntry1] : \par \par \par  The patient had his AICD replaced 5/1/13. Will need replacement soon treva.  HDl 49. He gained 5 pound.   Told again resume  exercise.  Told continue eat fish.    Depression better. AICD not fire . He use to see Jose. Now  Treva .History  CABG over 10 years ago. Last SE 5/19.    Lost job 7/18. Now working home. At times ALMEIDA.   SE  possibly sooner.  Ef by echo bekeith 20-26% . He needs weight loss and exercise. Working from home. .AICD changed 9/21. Leads 14 years old. Leads last 15 years. Told he will need new leads. Patient stressed. He has fatigue, He lost 2 llbs. Continue diet\par

## 2022-05-20 ENCOUNTER — APPOINTMENT (OUTPATIENT)
Dept: CARDIOLOGY | Facility: CLINIC | Age: 62
End: 2022-05-20
Payer: COMMERCIAL

## 2022-05-20 ENCOUNTER — NON-APPOINTMENT (OUTPATIENT)
Age: 62
End: 2022-05-20

## 2022-05-20 PROCEDURE — 93295 DEV INTERROG REMOTE 1/2/MLT: CPT

## 2022-05-20 PROCEDURE — 93296 REM INTERROG EVL PM/IDS: CPT

## 2022-07-18 ENCOUNTER — APPOINTMENT (OUTPATIENT)
Dept: CARDIOLOGY | Facility: CLINIC | Age: 62
End: 2022-07-18

## 2022-07-18 VITALS
WEIGHT: 197 LBS | SYSTOLIC BLOOD PRESSURE: 110 MMHG | DIASTOLIC BLOOD PRESSURE: 72 MMHG | BODY MASS INDEX: 32.82 KG/M2 | HEIGHT: 65 IN

## 2022-07-18 PROCEDURE — 99214 OFFICE O/P EST MOD 30 MIN: CPT

## 2022-07-18 NOTE — DISCUSSION/SUMMARY
[FreeTextEntry1] : \par \par \par  The patient had his AICD replaced 5/1/13. Will need replacement soon treva.  HDl 49. He gained 5 llbs.    Told again resume  exercise.  Told continue eat fish.    Depression better. AICD not fire . He use to see Jose. Now  Treva .History  CABG over 10 years ago. Last SE 5/19.    Lost job 7/18. Now working home. At times ALMEIDA.   SE  possibly sooner.  Ef by echo bekeith 20-26% . He needs weight loss and exercise. Working from home. .AICD changed 9/21. Leads 14 years old. Leads last 15 years. Told he will need new leads. Patient stressed. He has fatigue, Reviewed need weight loss. Will check blood \par

## 2022-07-18 NOTE — HISTORY OF PRESENT ILLNESS
[FreeTextEntry1] : No chest pain . No sob. He has fatigue. No sob.  He does not  exercise. He gained 5 llbs\par \par AICD changed 9/21. Leads 14 years old. Leads last 15 years. \par \par \par \par \par \par \par \par \par \par \par \par \par \par \par \par \par

## 2022-08-19 ENCOUNTER — APPOINTMENT (OUTPATIENT)
Dept: CARDIOLOGY | Facility: CLINIC | Age: 62
End: 2022-08-19

## 2022-08-19 ENCOUNTER — NON-APPOINTMENT (OUTPATIENT)
Age: 62
End: 2022-08-19

## 2022-08-19 PROCEDURE — 93295 DEV INTERROG REMOTE 1/2/MLT: CPT

## 2022-08-19 PROCEDURE — 93296 REM INTERROG EVL PM/IDS: CPT

## 2022-10-18 ENCOUNTER — APPOINTMENT (OUTPATIENT)
Dept: CARDIOLOGY | Facility: CLINIC | Age: 62
End: 2022-10-18

## 2022-10-18 VITALS
WEIGHT: 197 LBS | TEMPERATURE: 97.9 F | DIASTOLIC BLOOD PRESSURE: 60 MMHG | HEIGHT: 65 IN | SYSTOLIC BLOOD PRESSURE: 102 MMHG | HEART RATE: 60 BPM | BODY MASS INDEX: 32.82 KG/M2 | RESPIRATION RATE: 17 BRPM

## 2022-10-18 PROCEDURE — 93283 PRGRMG EVAL IMPLANTABLE DFB: CPT

## 2022-10-18 PROCEDURE — 99214 OFFICE O/P EST MOD 30 MIN: CPT

## 2022-10-18 NOTE — ADDENDUM
[FreeTextEntry1] : IAndreas assisted in documentation on 10/18/2022 acting as a scribe for Dr. Elias Shahid.

## 2022-10-18 NOTE — ASSESSMENT
[FreeTextEntry1] : CHF \par - Patient is currently euvolemic.\par - Continue Benazepril 5mg daily.\par - Carvedilol 6.25mg BID.\par \par Nonsustained VT\par - No significant events.\par - Continue Coreg 6.25mg BID. \par \par ·	I have spent 35  minutes of time on the encounter excluding separately reported services. \par \par I, Elias Shahid, personally performed the services described in this documentation. All medical record entries made by the scribe/nurse SAUL were at my direction and in my presence. I have reviewed the chart and agree that the record reflects my personal performance and is accurate and complete.\par

## 2022-10-18 NOTE — PROCEDURE
[No] : not [NSR] : normal sinus rhythm [ICD] : Implantable cardioverter-defibrillator [Voltage: ___ volts] : Voltage was [unfilled] volts [Longevity: ___ months] : The estimated remaining battery life is [unfilled] months [Threshold Testing Performed] : Threshold testing was performed [Atrial] : Atrial [Ventricular] : Ventricular [Lead Imp:  ___ohms] : lead impedance was [unfilled] ohms [Sensing Amplitude ___mv] : sensing amplitude was [unfilled] mv [___V @] : [unfilled] V [___ ms] : [unfilled] ms [Asense-Vsense ___ %] : Asense-Vsense [unfilled]% [Asense-Vpace ___ %] : Asense-Vpace [unfilled]% [Apace-Vsense ___ %] : Apace-Vsense [unfilled]% [Apace-Vpace ___ %] : Apace-Vpace [unfilled]% [Programmed for Longevity] : output reprogrammed for improved battery longevity [See Scanned Paceart Report] : See scanned paceart report [See Device Printout] : See device printout [de-identified] : Medtronic [de-identified] : Trudy MRI XT DR [de-identified] : [id number] [de-identified] : 09/15/2021 [de-identified] : AAI-DDD [de-identified] : [employer] [place] lead.  Impedance trends stable.  No short intervals recorded.

## 2022-10-18 NOTE — HISTORY OF PRESENT ILLNESS
[None] : The patient complains of no symptoms [Palpitations] : no palpitations [SOB] : no dyspnea [Syncope] : no syncope [Dizziness] : no dizziness [Chest Pain] : no chest pain or discomfort [ICD Shocks] : no recent ICD shocks [Erythema at Site] : no erythema at device site [Swelling at Site] : no swelling at device site [de-identified] : Patient is a very pleasant man with a history of ischemic cardiomyopathy, myocardial infarction, CABG, s/p ICD implant. Patient comes to my office for routine follow up. Patient is not complaining of chest pain, shortness of breath, or dyspnea on exertion. \par \par Echo (10/07/2020): EF 20-25%, mildly dilated left atrium, mild MR

## 2022-11-14 ENCOUNTER — APPOINTMENT (OUTPATIENT)
Dept: CARDIOLOGY | Facility: CLINIC | Age: 62
End: 2022-11-14

## 2022-11-14 VITALS
DIASTOLIC BLOOD PRESSURE: 66 MMHG | WEIGHT: 198 LBS | HEART RATE: 66 BPM | HEIGHT: 65 IN | OXYGEN SATURATION: 98 % | BODY MASS INDEX: 32.99 KG/M2 | SYSTOLIC BLOOD PRESSURE: 120 MMHG

## 2022-11-14 PROCEDURE — 99214 OFFICE O/P EST MOD 30 MIN: CPT

## 2022-11-14 NOTE — HISTORY OF PRESENT ILLNESS
[FreeTextEntry1] : No chest pain . No sob. He has fatigue. Medrano heavy exertion.   He does not  exercise. He gained 1 llb\par \par AICD changed 9/21. Leads 14 years old. Leads last 15 years. He does not exercise\par \par \par \par \par \par \par \par \par \par \par \par \par \par \par \par \par

## 2022-11-14 NOTE — DISCUSSION/SUMMARY
[FreeTextEntry1] : \par \par \par  The patient had his AICD replaced 5/1/13. Will need replacement soon treva.  HDl 49. He gained 5 llbs.    Told again resume  exercise.  Told continue eat fish.    Depression better. AICD not fire . He use to see Jose. Now  Treva .History  CABG over 10 years ago. Last SE 5/19.    Lost job 7/18. Now working home. At times ALMEIDA heavy exertion.    SE  possibly sooner.  Ef by echo bekeith 20-26% . He needs weight loss and exercise. Working from home. .AICD changed 9/21. Leads 14 years old. Leads last 15 years. Told he will need new leads. Patient stressed. He has fatigue, Reviewed need weight loss. Bloods reviewed with patient and wife. Told exercise\par

## 2023-01-17 ENCOUNTER — APPOINTMENT (OUTPATIENT)
Dept: CARDIOLOGY | Facility: CLINIC | Age: 63
End: 2023-01-17
Payer: COMMERCIAL

## 2023-01-17 ENCOUNTER — NON-APPOINTMENT (OUTPATIENT)
Age: 63
End: 2023-01-17

## 2023-01-17 PROCEDURE — 93296 REM INTERROG EVL PM/IDS: CPT

## 2023-01-17 PROCEDURE — 93295 DEV INTERROG REMOTE 1/2/MLT: CPT

## 2023-03-06 ENCOUNTER — APPOINTMENT (OUTPATIENT)
Dept: CARDIOLOGY | Facility: CLINIC | Age: 63
End: 2023-03-06
Payer: COMMERCIAL

## 2023-03-06 VITALS
WEIGHT: 197 LBS | HEIGHT: 65 IN | DIASTOLIC BLOOD PRESSURE: 78 MMHG | BODY MASS INDEX: 32.82 KG/M2 | SYSTOLIC BLOOD PRESSURE: 122 MMHG

## 2023-03-06 PROCEDURE — 99214 OFFICE O/P EST MOD 30 MIN: CPT

## 2023-03-06 NOTE — HISTORY OF PRESENT ILLNESS
[FreeTextEntry1] : No chest pain . No sob. He has fatigue. Medrano heavy exertion.   He does not  exercise. He lost 1 llb\par \par AICD changed 9/21. Leads 14 years old. Leads last 15 years. He does not exercise. Son to get  10/23\par \par \par \par \par \par \par \par \par \par \par \par \par \par \par \par \par

## 2023-03-06 NOTE — DISCUSSION/SUMMARY
[FreeTextEntry1] : \par \par \par  The patient had his AICD replaced 5/1/13. Will need replacement soon treva.  HDl 49. He gained 5 llbs.    Told again resume  exercise.  Told continue eat fish.    Depression better. AICD not fire . He use to see Jen. Now  Treva .History  CABG over 16 years ago. Last SE 5/19.    Lost job 7/18. Now working home. At times ALMEIDA heavy exertion.    SE  possibly sooner.  Ef by echo jen 20-26% . He needs weight loss and exercise. Working from home. .AICD changed 9/21. Leads 14 years old. Leads last 15 years. Told he will need new leads. Patient stressed. He has fatigue, Reviewed need weight loss.  He has ALMEIDA CABG 16 years. Last SE 5/19. He needs a stress echo.\par

## 2023-03-06 NOTE — REASON FOR VISIT
[Cardiac Failure] : cardiac failure [Coronary Artery Disease] : coronary artery disease [Spouse] : spouse

## 2023-04-18 ENCOUNTER — NON-APPOINTMENT (OUTPATIENT)
Age: 63
End: 2023-04-18

## 2023-04-18 ENCOUNTER — APPOINTMENT (OUTPATIENT)
Dept: CARDIOLOGY | Facility: CLINIC | Age: 63
End: 2023-04-18
Payer: COMMERCIAL

## 2023-04-18 PROCEDURE — 93296 REM INTERROG EVL PM/IDS: CPT

## 2023-04-18 PROCEDURE — 93295 DEV INTERROG REMOTE 1/2/MLT: CPT

## 2023-04-27 ENCOUNTER — APPOINTMENT (OUTPATIENT)
Dept: CV DIAGNOSTICS | Facility: HOSPITAL | Age: 63
End: 2023-04-27
Payer: COMMERCIAL

## 2023-04-27 ENCOUNTER — OUTPATIENT (OUTPATIENT)
Dept: OUTPATIENT SERVICES | Facility: HOSPITAL | Age: 63
LOS: 1 days | End: 2023-04-27
Payer: COMMERCIAL

## 2023-04-27 DIAGNOSIS — Z95.1 PRESENCE OF AORTOCORONARY BYPASS GRAFT: ICD-10-CM

## 2023-04-27 DIAGNOSIS — C44.91 BASAL CELL CARCINOMA OF SKIN, UNSPECIFIED: Chronic | ICD-10-CM

## 2023-04-27 DIAGNOSIS — I25.810 ATHEROSCLEROSIS OF CORONARY ARTERY BYPASS GRAFT(S) WITHOUT ANGINA PECTORIS: Chronic | ICD-10-CM

## 2023-04-27 DIAGNOSIS — Z90.49 ACQUIRED ABSENCE OF OTHER SPECIFIED PARTS OF DIGESTIVE TRACT: Chronic | ICD-10-CM

## 2023-04-27 DIAGNOSIS — I42.9 CARDIOMYOPATHY, UNSPECIFIED: ICD-10-CM

## 2023-04-27 DIAGNOSIS — Z98.890 OTHER SPECIFIED POSTPROCEDURAL STATES: Chronic | ICD-10-CM

## 2023-04-27 DIAGNOSIS — K43.2 INCISIONAL HERNIA WITHOUT OBSTRUCTION OR GANGRENE: Chronic | ICD-10-CM

## 2023-04-27 PROCEDURE — 93351 STRESS TTE COMPLETE: CPT

## 2023-04-27 PROCEDURE — 93320 DOPPLER ECHO COMPLETE: CPT

## 2023-04-27 PROCEDURE — 93325 DOPPLER ECHO COLOR FLOW MAPG: CPT

## 2023-04-27 PROCEDURE — 93351 STRESS TTE COMPLETE: CPT | Mod: 26

## 2023-04-28 DIAGNOSIS — Z95.1 PRESENCE OF AORTOCORONARY BYPASS GRAFT: ICD-10-CM

## 2023-04-28 DIAGNOSIS — I42.9 CARDIOMYOPATHY, UNSPECIFIED: ICD-10-CM

## 2023-07-18 ENCOUNTER — APPOINTMENT (OUTPATIENT)
Dept: CARDIOLOGY | Facility: CLINIC | Age: 63
End: 2023-07-18
Payer: COMMERCIAL

## 2023-07-18 ENCOUNTER — NON-APPOINTMENT (OUTPATIENT)
Age: 63
End: 2023-07-18

## 2023-07-18 PROCEDURE — 93295 DEV INTERROG REMOTE 1/2/MLT: CPT

## 2023-07-18 PROCEDURE — 93296 REM INTERROG EVL PM/IDS: CPT

## 2023-08-10 ENCOUNTER — APPOINTMENT (OUTPATIENT)
Dept: CARDIOLOGY | Facility: CLINIC | Age: 63
End: 2023-08-10
Payer: COMMERCIAL

## 2023-08-10 VITALS
DIASTOLIC BLOOD PRESSURE: 70 MMHG | BODY MASS INDEX: 32.32 KG/M2 | HEIGHT: 65 IN | SYSTOLIC BLOOD PRESSURE: 120 MMHG | WEIGHT: 194 LBS

## 2023-08-10 PROCEDURE — 99214 OFFICE O/P EST MOD 30 MIN: CPT

## 2023-08-10 NOTE — HISTORY OF PRESENT ILLNESS
[FreeTextEntry1] : No chest pain . No sob. He has fatigue. Medrano heavy exertion.   He does not  exercise. He lost 3 llb  AICD changed 9/21. Leads 14 years old. Leads last 15 years. he walks 5 miles 1-2 times a week

## 2023-08-10 NOTE — DISCUSSION/SUMMARY
[FreeTextEntry1] :   The patient had his AICD replaced 5/1/13. Will need replacement soon treva.  HDl 49. He gained 5 llbs.    Told again resume  exercise.  Told continue eat fish.    Depression better. AICD not fire . He use to see Jen. Now  Treva .History  CABG over 16 years ago. Last SE 5/19.    Lost job 7/18. Now working home. At times ALMEIDA heavy exertion.    SE  possibly sooner.  Ef by echo jen 20-26% . He needs weight loss and exercise. Working from home. .AICD changed 9/21. Leads 14 years old. Leads last 15 years. Told he will need new leads. Patient stressed. He has fatigue, Reviewed need weight loss.  He has ALMEIDA CABG 16 years. Last SE 4/23 fixed EF 40-45 %.. Told try increase exercise. Reviewed SE with patient and wife. Will check blood soon

## 2023-10-17 ENCOUNTER — APPOINTMENT (OUTPATIENT)
Dept: ELECTROPHYSIOLOGY | Facility: CLINIC | Age: 63
End: 2023-10-17
Payer: COMMERCIAL

## 2023-10-17 VITALS
HEIGHT: 65 IN | BODY MASS INDEX: 31.65 KG/M2 | DIASTOLIC BLOOD PRESSURE: 70 MMHG | SYSTOLIC BLOOD PRESSURE: 119 MMHG | WEIGHT: 190 LBS | TEMPERATURE: 98 F

## 2023-10-17 PROCEDURE — 93283 PRGRMG EVAL IMPLANTABLE DFB: CPT

## 2023-10-17 PROCEDURE — 93290 INTERROG DEV EVAL ICPMS IP: CPT

## 2023-10-17 RX ORDER — ASPIRIN 81 MG/1
81 TABLET, DELAYED RELEASE ORAL DAILY
Refills: 0 | Status: ACTIVE | COMMUNITY

## 2023-10-17 RX ORDER — MULTIVIT-MIN/FOLIC/VIT K/LYCOP 400-300MCG
50 MCG TABLET ORAL
Refills: 0 | Status: ACTIVE | COMMUNITY
Start: 2022-11-14

## 2023-12-14 ENCOUNTER — APPOINTMENT (OUTPATIENT)
Dept: CARDIOLOGY | Facility: CLINIC | Age: 63
End: 2023-12-14
Payer: COMMERCIAL

## 2023-12-14 VITALS
DIASTOLIC BLOOD PRESSURE: 78 MMHG | OXYGEN SATURATION: 100 % | SYSTOLIC BLOOD PRESSURE: 122 MMHG | WEIGHT: 193 LBS | HEART RATE: 63 BPM | HEIGHT: 65 IN | BODY MASS INDEX: 32.15 KG/M2

## 2023-12-14 DIAGNOSIS — Z95.810 PRESENCE OF AUTOMATIC (IMPLANTABLE) CARDIAC DEFIBRILLATOR: ICD-10-CM

## 2023-12-14 DIAGNOSIS — I47.29 OTHER VENTRICULAR TACHYCARDIA: ICD-10-CM

## 2023-12-14 PROCEDURE — 99213 OFFICE O/P EST LOW 20 MIN: CPT

## 2023-12-14 RX ORDER — CARVEDILOL 6.25 MG/1
6.25 TABLET, FILM COATED ORAL TWICE DAILY
Qty: 180 | Refills: 3 | Status: ACTIVE | COMMUNITY
Start: 1900-01-01 | End: 1900-01-01

## 2023-12-14 RX ORDER — BENAZEPRIL HYDROCHLORIDE 5 MG/1
5 TABLET ORAL DAILY
Qty: 90 | Refills: 3 | Status: ACTIVE | COMMUNITY
Start: 2021-08-20 | End: 1900-01-01

## 2023-12-14 RX ORDER — SIMVASTATIN 40 MG/1
40 TABLET, FILM COATED ORAL
Qty: 90 | Refills: 3 | Status: ACTIVE | COMMUNITY
Start: 1900-01-01 | End: 1900-01-01

## 2023-12-14 NOTE — DISCUSSION/SUMMARY
[FreeTextEntry1] :  The patient had his AICD replaced 5/1/13. Will need replacement soon treva.  HDl 49. He gained 5 llbs.    Told again resume  exercise.  Told continue eat fish.    Depression better. AICD not fire . He use to see Jen. Now  Treva .History  CABG over 16 years ago. Last SE 5/19.    Lost job 7/18. Now working home. At times ALMEIDA heavy exertion.    SE  possibly sooner.  Ef by echo jen 20-26% . He needs weight loss and exercise. Working from home. .AICD changed 9/21. Leads 14 years old. Leads last 15 years. Told he will need new leads. Patient stressed. He has fatigue, Reviewed need weight loss.  He has ALMEIDA CABG 16 years. Last SE 4/23 fixed EF 40-45 %.. Told try increase exercise.  .. To repeat if high. May switch zocor to crestor

## 2023-12-14 NOTE — HISTORY OF PRESENT ILLNESS
[FreeTextEntry1] : No chest pain . No sob. He has fatigue.No change.  Medrano heavy exertion.   He does not  exercise. He gained  3 llb  AICD changed 9/21. Leads 14 years old. Leads last 15 years. Not exercise.

## 2024-01-16 ENCOUNTER — APPOINTMENT (OUTPATIENT)
Dept: CARDIOLOGY | Facility: CLINIC | Age: 64
End: 2024-01-16
Payer: COMMERCIAL

## 2024-01-16 ENCOUNTER — NON-APPOINTMENT (OUTPATIENT)
Age: 64
End: 2024-01-16

## 2024-01-16 PROCEDURE — 93296 REM INTERROG EVL PM/IDS: CPT

## 2024-01-16 PROCEDURE — 93295 DEV INTERROG REMOTE 1/2/MLT: CPT

## 2024-04-08 ENCOUNTER — APPOINTMENT (OUTPATIENT)
Dept: CARDIOLOGY | Facility: CLINIC | Age: 64
End: 2024-04-08
Payer: COMMERCIAL

## 2024-04-08 VITALS
BODY MASS INDEX: 32.49 KG/M2 | WEIGHT: 195 LBS | DIASTOLIC BLOOD PRESSURE: 82 MMHG | HEART RATE: 59 BPM | HEIGHT: 65 IN | OXYGEN SATURATION: 97 % | SYSTOLIC BLOOD PRESSURE: 120 MMHG

## 2024-04-08 DIAGNOSIS — I50.22 CHRONIC SYSTOLIC (CONGESTIVE) HEART FAILURE: ICD-10-CM

## 2024-04-08 DIAGNOSIS — I42.9 CARDIOMYOPATHY, UNSPECIFIED: ICD-10-CM

## 2024-04-08 DIAGNOSIS — Z95.1 PRESENCE OF AORTOCORONARY BYPASS GRAFT: ICD-10-CM

## 2024-04-08 PROCEDURE — 99213 OFFICE O/P EST LOW 20 MIN: CPT | Mod: 25

## 2024-04-08 PROCEDURE — 93000 ELECTROCARDIOGRAM COMPLETE: CPT

## 2024-04-08 NOTE — HISTORY OF PRESENT ILLNESS
[FreeTextEntry1] : No chest pain . No sob. He has fatigue.No change.  Medrano heavy exertion. No change.   He does not  exercise. He gained  2 llb  AICD changed 9/21. Leads 14 years old. Leads last 15 years. Not exercise.

## 2024-04-08 NOTE — DISCUSSION/SUMMARY
[FreeTextEntry1] :  The patient had his AICD replaced 5/1/13. Will need replacement soon treva.  HDl 49. He gained 5 llbs.    Told again resume  exercise.  Told continue eat fish.    Depression better. AICD not fire . He use to see Jen. Now  Treva .History  CABG over 16 years ago. Last SE 5/19.    Lost job 7/18. Now working home. At times ALMEIDA heavy exertion.    SE  possibly sooner.  Ef by echo jen 20-26% . He needs weight loss and exercise. Working from home. .AICD changed 9/21. Leads 14 years old. Leads last 15 years. Told he will need new leads. Patient stressed. He has fatigue, Reviewed need weight loss.  He has ALMEIDA CABG 16 years. Last SE 4/23 fixed EF 40-45 %.. Told try increase exercise. LDL was 107 . now 72. . Now  taking pills. Instructed take meds.EKG reviewed and compared last ekg.

## 2024-04-16 ENCOUNTER — NON-APPOINTMENT (OUTPATIENT)
Age: 64
End: 2024-04-16

## 2024-04-17 ENCOUNTER — APPOINTMENT (OUTPATIENT)
Dept: CARDIOLOGY | Facility: CLINIC | Age: 64
End: 2024-04-17
Payer: COMMERCIAL

## 2024-04-17 PROCEDURE — 93296 REM INTERROG EVL PM/IDS: CPT

## 2024-04-17 PROCEDURE — 93295 DEV INTERROG REMOTE 1/2/MLT: CPT

## 2024-07-17 ENCOUNTER — NON-APPOINTMENT (OUTPATIENT)
Age: 64
End: 2024-07-17

## 2024-07-17 ENCOUNTER — APPOINTMENT (OUTPATIENT)
Dept: CARDIOLOGY | Facility: CLINIC | Age: 64
End: 2024-07-17
Payer: COMMERCIAL

## 2024-07-17 PROCEDURE — 93295 DEV INTERROG REMOTE 1/2/MLT: CPT

## 2024-07-17 PROCEDURE — 93296 REM INTERROG EVL PM/IDS: CPT

## 2024-08-12 ENCOUNTER — APPOINTMENT (OUTPATIENT)
Dept: CARDIOLOGY | Facility: CLINIC | Age: 64
End: 2024-08-12
Payer: COMMERCIAL

## 2024-08-12 VITALS
DIASTOLIC BLOOD PRESSURE: 70 MMHG | HEART RATE: 65 BPM | BODY MASS INDEX: 32.15 KG/M2 | WEIGHT: 193 LBS | OXYGEN SATURATION: 97 % | SYSTOLIC BLOOD PRESSURE: 110 MMHG | HEIGHT: 65 IN

## 2024-08-12 DIAGNOSIS — Z95.1 PRESENCE OF AORTOCORONARY BYPASS GRAFT: ICD-10-CM

## 2024-08-12 DIAGNOSIS — I50.22 CHRONIC SYSTOLIC (CONGESTIVE) HEART FAILURE: ICD-10-CM

## 2024-08-12 DIAGNOSIS — Z95.810 PRESENCE OF AUTOMATIC (IMPLANTABLE) CARDIAC DEFIBRILLATOR: ICD-10-CM

## 2024-08-12 DIAGNOSIS — I42.9 CARDIOMYOPATHY, UNSPECIFIED: ICD-10-CM

## 2024-08-12 PROCEDURE — G2211 COMPLEX E/M VISIT ADD ON: CPT | Mod: NC

## 2024-08-12 PROCEDURE — 99213 OFFICE O/P EST LOW 20 MIN: CPT

## 2024-08-12 NOTE — HISTORY OF PRESENT ILLNESS
[FreeTextEntry1] : No chest pain . No sob. He has fatigue. May be better. .  Medrano heavy exertion. No change.   He does not  exercise. He lost  2 llb. To stop work end Sept.  AICD changed 9/21. Leads 14 years old. Leads last 15 years. Not exercise.

## 2024-08-12 NOTE — DISCUSSION/SUMMARY
[FreeTextEntry1] :  The patient had his AICD replaced 5/1/13. Will need replacement soon treva.  HDl 49. He lost 2  llbs.    Told again resume  exercise.  Told continue eat fish.    Depression better. AICD not fire . He use to see Jen. Now  Treva .History  CABG over 16 years ago. Last SE 5/19.    Lost job 7/18. Now working home. At times ALMEIDA heavy exertion.    SE  possibly sooner.  Ef by echo jen 20-26% . He needs weight loss and exercise. Working from home. .AICD changed 9/21. Leads 14 years old. Leads last 15 years. Told he will need new leads. Patient stressed. He has fatigue, Reviewed need weight loss.  He has ALMEIDA CABG 16 years. Last SE 4/23 fixed EF 40-45 %.. . LDL was 107 . now 72. . Now  taking pills. Instructed take meds. Told once retire . He will need exercise. Watch weight

## 2024-10-17 ENCOUNTER — APPOINTMENT (OUTPATIENT)
Dept: ELECTROPHYSIOLOGY | Facility: CLINIC | Age: 64
End: 2024-10-17
Payer: COMMERCIAL

## 2024-10-17 VITALS
DIASTOLIC BLOOD PRESSURE: 67 MMHG | TEMPERATURE: 97.1 F | HEIGHT: 65 IN | HEART RATE: 50 BPM | BODY MASS INDEX: 31.65 KG/M2 | SYSTOLIC BLOOD PRESSURE: 101 MMHG | WEIGHT: 190 LBS

## 2024-10-17 DIAGNOSIS — I47.29 OTHER VENTRICULAR TACHYCARDIA: ICD-10-CM

## 2024-10-17 DIAGNOSIS — Z95.810 PRESENCE OF AUTOMATIC (IMPLANTABLE) CARDIAC DEFIBRILLATOR: ICD-10-CM

## 2024-10-17 DIAGNOSIS — I50.22 CHRONIC SYSTOLIC (CONGESTIVE) HEART FAILURE: ICD-10-CM

## 2024-10-17 DIAGNOSIS — Z45.02 ENCOUNTER FOR ADJUSTMENT AND MANAGEMENT OF AUTOMATIC IMPLANTABLE CARDIAC DEFIBRILLATOR: ICD-10-CM

## 2024-10-17 PROCEDURE — 93283 PRGRMG EVAL IMPLANTABLE DFB: CPT

## 2024-10-17 PROCEDURE — 99214 OFFICE O/P EST MOD 30 MIN: CPT | Mod: 25

## 2024-10-17 PROCEDURE — 93000 ELECTROCARDIOGRAM COMPLETE: CPT | Mod: 59

## 2024-12-16 ENCOUNTER — APPOINTMENT (OUTPATIENT)
Dept: CARDIOLOGY | Facility: CLINIC | Age: 64
End: 2024-12-16
Payer: COMMERCIAL

## 2024-12-16 VITALS
SYSTOLIC BLOOD PRESSURE: 110 MMHG | OXYGEN SATURATION: 97 % | WEIGHT: 191 LBS | HEIGHT: 65 IN | DIASTOLIC BLOOD PRESSURE: 72 MMHG | BODY MASS INDEX: 31.82 KG/M2 | HEART RATE: 61 BPM

## 2024-12-16 DIAGNOSIS — I50.22 CHRONIC SYSTOLIC (CONGESTIVE) HEART FAILURE: ICD-10-CM

## 2024-12-16 DIAGNOSIS — Z95.810 PRESENCE OF AUTOMATIC (IMPLANTABLE) CARDIAC DEFIBRILLATOR: ICD-10-CM

## 2024-12-16 DIAGNOSIS — I47.29 OTHER VENTRICULAR TACHYCARDIA: ICD-10-CM

## 2024-12-16 DIAGNOSIS — I42.9 CARDIOMYOPATHY, UNSPECIFIED: ICD-10-CM

## 2024-12-16 PROCEDURE — 99213 OFFICE O/P EST LOW 20 MIN: CPT

## 2025-01-03 DIAGNOSIS — E53.8 DEFICIENCY OF OTHER SPECIFIED B GROUP VITAMINS: ICD-10-CM

## 2025-01-03 DIAGNOSIS — E55.9 VITAMIN D DEFICIENCY, UNSPECIFIED: ICD-10-CM

## 2025-01-15 ENCOUNTER — NON-APPOINTMENT (OUTPATIENT)
Age: 65
End: 2025-01-15

## 2025-01-15 ENCOUNTER — APPOINTMENT (OUTPATIENT)
Dept: CARDIOLOGY | Facility: CLINIC | Age: 65
End: 2025-01-15
Payer: COMMERCIAL

## 2025-01-15 PROCEDURE — 93295 DEV INTERROG REMOTE 1/2/MLT: CPT

## 2025-01-15 PROCEDURE — 93296 REM INTERROG EVL PM/IDS: CPT

## 2025-04-16 ENCOUNTER — APPOINTMENT (OUTPATIENT)
Dept: CARDIOLOGY | Facility: CLINIC | Age: 65
End: 2025-04-16
Payer: COMMERCIAL

## 2025-04-16 ENCOUNTER — NON-APPOINTMENT (OUTPATIENT)
Age: 65
End: 2025-04-16

## 2025-04-16 PROCEDURE — 93295 DEV INTERROG REMOTE 1/2/MLT: CPT

## 2025-04-16 PROCEDURE — 93296 REM INTERROG EVL PM/IDS: CPT

## 2025-04-17 ENCOUNTER — APPOINTMENT (OUTPATIENT)
Dept: CARDIOLOGY | Facility: CLINIC | Age: 65
End: 2025-04-17
Payer: COMMERCIAL

## 2025-04-17 VITALS
HEIGHT: 65 IN | HEART RATE: 50 BPM | OXYGEN SATURATION: 98 % | SYSTOLIC BLOOD PRESSURE: 115 MMHG | DIASTOLIC BLOOD PRESSURE: 74 MMHG | BODY MASS INDEX: 31.65 KG/M2 | WEIGHT: 190 LBS

## 2025-04-17 DIAGNOSIS — Z95.810 PRESENCE OF AUTOMATIC (IMPLANTABLE) CARDIAC DEFIBRILLATOR: ICD-10-CM

## 2025-04-17 DIAGNOSIS — I42.9 CARDIOMYOPATHY, UNSPECIFIED: ICD-10-CM

## 2025-04-17 DIAGNOSIS — I50.22 CHRONIC SYSTOLIC (CONGESTIVE) HEART FAILURE: ICD-10-CM

## 2025-04-17 DIAGNOSIS — Z95.1 PRESENCE OF AORTOCORONARY BYPASS GRAFT: ICD-10-CM

## 2025-04-17 DIAGNOSIS — I47.29 OTHER VENTRICULAR TACHYCARDIA: ICD-10-CM

## 2025-04-17 PROCEDURE — 99213 OFFICE O/P EST LOW 20 MIN: CPT

## 2025-04-17 PROCEDURE — G2211 COMPLEX E/M VISIT ADD ON: CPT | Mod: NC

## 2025-07-16 ENCOUNTER — NON-APPOINTMENT (OUTPATIENT)
Age: 65
End: 2025-07-16

## 2025-07-16 ENCOUNTER — APPOINTMENT (OUTPATIENT)
Dept: CARDIOLOGY | Facility: CLINIC | Age: 65
End: 2025-07-16
Payer: COMMERCIAL

## 2025-07-16 PROCEDURE — 93295 DEV INTERROG REMOTE 1/2/MLT: CPT

## 2025-07-16 PROCEDURE — 93296 REM INTERROG EVL PM/IDS: CPT

## 2025-08-07 ENCOUNTER — APPOINTMENT (OUTPATIENT)
Dept: CARDIOLOGY | Facility: CLINIC | Age: 65
End: 2025-08-07

## 2025-08-21 ENCOUNTER — APPOINTMENT (OUTPATIENT)
Dept: CARDIOLOGY | Facility: CLINIC | Age: 65
End: 2025-08-21
Payer: COMMERCIAL

## 2025-08-21 VITALS
HEART RATE: 61 BPM | HEIGHT: 65 IN | WEIGHT: 187 LBS | SYSTOLIC BLOOD PRESSURE: 107 MMHG | DIASTOLIC BLOOD PRESSURE: 71 MMHG | BODY MASS INDEX: 31.16 KG/M2 | OXYGEN SATURATION: 98 %

## 2025-08-21 DIAGNOSIS — I25.5 ISCHEMIC CARDIOMYOPATHY: ICD-10-CM

## 2025-08-21 DIAGNOSIS — E78.5 HYPERLIPIDEMIA, UNSPECIFIED: ICD-10-CM

## 2025-08-21 DIAGNOSIS — I25.10 ATHEROSCLEROTIC HEART DISEASE OF NATIVE CORONARY ARTERY W/OUT ANGINA PECTORIS: ICD-10-CM

## 2025-08-21 DIAGNOSIS — I10 ESSENTIAL (PRIMARY) HYPERTENSION: ICD-10-CM

## 2025-08-21 DIAGNOSIS — R06.00 DYSPNEA, UNSPECIFIED: ICD-10-CM

## 2025-08-21 DIAGNOSIS — Z95.810 PRESENCE OF AUTOMATIC (IMPLANTABLE) CARDIAC DEFIBRILLATOR: ICD-10-CM

## 2025-08-21 PROCEDURE — 93000 ELECTROCARDIOGRAM COMPLETE: CPT

## 2025-08-21 PROCEDURE — 99214 OFFICE O/P EST MOD 30 MIN: CPT

## 2025-08-21 PROCEDURE — G2211 COMPLEX E/M VISIT ADD ON: CPT | Mod: NC
